# Patient Record
(demographics unavailable — no encounter records)

---

## 2025-01-13 NOTE — COUNSELING
[Sleep ___ hours/day] : Sleep [unfilled] hours/day [Encouraged to increase physical activity] : Encouraged to increase physical activity [Target Wt Loss Goal ___] : Weight Loss Goals: Target weight loss goal [unfilled] lbs [____ min/wk Activity] : [unfilled] min/wk activity

## 2025-01-14 NOTE — INTERPRETER SERVICES
[Patient Declined  Services] : - None: Patient declined  services [FreeTextEntry3] :  Shared Language Monegasque with MD

## 2025-01-14 NOTE — PHYSICAL EXAM
Returned callers call and she states she wants to cancel procedure a COS[facility]    [No Acute Distress] : no acute distress [Well Nourished] : well nourished [Well Developed] : well developed [Well-Appearing] : well-appearing [Normal] : no acute distress, well nourished, well developed and well-appearing [Normal Sclera/Conjunctiva] : normal sclera/conjunctiva [PERRL] : pupils equal round and reactive to light [EOMI] : extraocular movements intact [Normal Outer Ear/Nose] : the outer ears and nose were normal in appearance [Normal Oropharynx] : the oropharynx was normal [Normal TMs] : both tympanic membranes were normal [No JVD] : no jugular venous distention [No Lymphadenopathy] : no lymphadenopathy [Supple] : supple [Thyroid Normal, No Nodules] : the thyroid was normal and there were no nodules present [No Respiratory Distress] : no respiratory distress  [No Accessory Muscle Use] : no accessory muscle use [Clear to Auscultation] : lungs were clear to auscultation bilaterally [Normal Rate] : normal rate  [Regular Rhythm] : with a regular rhythm [Normal S1, S2] : normal S1 and S2 [No Murmur] : no murmur heard [No Carotid Bruits] : no carotid bruits [No Abdominal Bruit] : a ~M bruit was not heard ~T in the abdomen [No Varicosities] : no varicosities [Pedal Pulses Present] : the pedal pulses are present [No Edema] : there was no peripheral edema [No Palpable Aorta] : no palpable aorta [No Extremity Clubbing/Cyanosis] : no extremity clubbing/cyanosis [Soft] : abdomen soft [Non Tender] : non-tender [Non-distended] : non-distended [No Masses] : no abdominal mass palpated [No HSM] : no HSM [Normal Bowel Sounds] : normal bowel sounds [Normal Anterior Cervical Nodes] : no anterior cervical lymphadenopathy [Normal Posterior Cervical Nodes] : no posterior cervical lymphadenopathy [No CVA Tenderness] : no CVA  tenderness [No Spinal Tenderness] : no spinal tenderness [No Joint Swelling] : no joint swelling [Grossly Normal Strength/Tone] : grossly normal strength/tone [No Rash] : no rash [Coordination Grossly Intact] : coordination grossly intact [No Focal Deficits] : no focal deficits [Normal Gait] : normal gait [Normal Affect] : the affect was normal [Normal Insight/Judgement] : insight and judgment were intact [de-identified] : +bilateral calcaneal squeeze test

## 2025-01-14 NOTE — HISTORY OF PRESENT ILLNESS
[de-identified] : Patient is a 53F with hx of prediabetes, obesity, prior dermoid cyst on US (resolved on subsequent) here for CPE -chest pain pressure left sided with SOB at rest. radiate to her back.  Thinks it might be anxiety.  -6 month of foot pain located in the heal. Not worse in morning or at night, just constant. worse with walking. always wears sneakers.  -also notes recently she has been feeling pelvic discomfort. thinks it might be anxiety about her surgery, which she states was traumatizing for her.

## 2025-01-14 NOTE — PHYSICAL EXAM
[No Acute Distress] : no acute distress [Well Nourished] : well nourished [Well Developed] : well developed [Well-Appearing] : well-appearing [Normal] : no acute distress, well nourished, well developed and well-appearing [Normal Sclera/Conjunctiva] : normal sclera/conjunctiva [PERRL] : pupils equal round and reactive to light [EOMI] : extraocular movements intact [Normal Outer Ear/Nose] : the outer ears and nose were normal in appearance [Normal Oropharynx] : the oropharynx was normal [Normal TMs] : both tympanic membranes were normal [No JVD] : no jugular venous distention [No Lymphadenopathy] : no lymphadenopathy [Supple] : supple [Thyroid Normal, No Nodules] : the thyroid was normal and there were no nodules present [No Respiratory Distress] : no respiratory distress  [No Accessory Muscle Use] : no accessory muscle use [Clear to Auscultation] : lungs were clear to auscultation bilaterally [Normal Rate] : normal rate  [Regular Rhythm] : with a regular rhythm [Normal S1, S2] : normal S1 and S2 [No Murmur] : no murmur heard [No Carotid Bruits] : no carotid bruits [No Abdominal Bruit] : a ~M bruit was not heard ~T in the abdomen [No Varicosities] : no varicosities [Pedal Pulses Present] : the pedal pulses are present [No Edema] : there was no peripheral edema [No Palpable Aorta] : no palpable aorta [No Extremity Clubbing/Cyanosis] : no extremity clubbing/cyanosis [Soft] : abdomen soft [Non Tender] : non-tender [Non-distended] : non-distended [No Masses] : no abdominal mass palpated [No HSM] : no HSM [Normal Bowel Sounds] : normal bowel sounds [Normal Posterior Cervical Nodes] : no posterior cervical lymphadenopathy [Normal Anterior Cervical Nodes] : no anterior cervical lymphadenopathy [No CVA Tenderness] : no CVA  tenderness [No Spinal Tenderness] : no spinal tenderness [No Joint Swelling] : no joint swelling [Grossly Normal Strength/Tone] : grossly normal strength/tone [No Rash] : no rash [Coordination Grossly Intact] : coordination grossly intact [No Focal Deficits] : no focal deficits [Normal Gait] : normal gait [Normal Affect] : the affect was normal [Normal Insight/Judgement] : insight and judgment were intact [de-identified] : +bilateral calcaneal squeeze test

## 2025-01-14 NOTE — HEALTH RISK ASSESSMENT
[de-identified] : Very little [de-identified] : tries to eat healthy but difficult during the holidays for her [MammogramDate] : 11/23 [MammogramComments] : BIRADS 2 [PapSmearComments] : total hysterectomy [ColonoscopyComments] : June 2024

## 2025-01-14 NOTE — HISTORY OF PRESENT ILLNESS
[de-identified] : Patient is a 53F with hx of prediabetes, obesity, prior dermoid cyst on US (resolved on subsequent) here for CPE -chest pain pressure left sided with SOB at rest. radiate to her back.  Thinks it might be anxiety.  -6 month of foot pain located in the heal. Not worse in morning or at night, just constant. worse with walking. always wears sneakers.  -also notes recently she has been feeling pelvic discomfort. thinks it might be anxiety about her surgery, which she states was traumatizing for her.

## 2025-01-14 NOTE — HEALTH RISK ASSESSMENT
[de-identified] : Very little [de-identified] : tries to eat healthy but difficult during the holidays for her [MammogramDate] : 11/23 [MammogramComments] : BIRADS 2 [PapSmearComments] : total hysterectomy [ColonoscopyComments] : June 2024

## 2025-01-14 NOTE — PHYSICAL EXAM
[No Acute Distress] : no acute distress [Well Nourished] : well nourished [Well Developed] : well developed [Well-Appearing] : well-appearing [Normal] : no acute distress, well nourished, well developed and well-appearing [Normal Sclera/Conjunctiva] : normal sclera/conjunctiva [PERRL] : pupils equal round and reactive to light [EOMI] : extraocular movements intact [Normal Outer Ear/Nose] : the outer ears and nose were normal in appearance [Normal Oropharynx] : the oropharynx was normal [Normal TMs] : both tympanic membranes were normal [No JVD] : no jugular venous distention [No Lymphadenopathy] : no lymphadenopathy [Supple] : supple [Thyroid Normal, No Nodules] : the thyroid was normal and there were no nodules present [No Respiratory Distress] : no respiratory distress  [No Accessory Muscle Use] : no accessory muscle use [Clear to Auscultation] : lungs were clear to auscultation bilaterally [Normal Rate] : normal rate  [Regular Rhythm] : with a regular rhythm [Normal S1, S2] : normal S1 and S2 [No Murmur] : no murmur heard [No Carotid Bruits] : no carotid bruits [No Abdominal Bruit] : a ~M bruit was not heard ~T in the abdomen [No Varicosities] : no varicosities [Pedal Pulses Present] : the pedal pulses are present [No Edema] : there was no peripheral edema [No Palpable Aorta] : no palpable aorta [No Extremity Clubbing/Cyanosis] : no extremity clubbing/cyanosis [Soft] : abdomen soft [Non Tender] : non-tender [Non-distended] : non-distended [No Masses] : no abdominal mass palpated [No HSM] : no HSM [Normal Bowel Sounds] : normal bowel sounds [Normal Anterior Cervical Nodes] : no anterior cervical lymphadenopathy [Normal Posterior Cervical Nodes] : no posterior cervical lymphadenopathy [No CVA Tenderness] : no CVA  tenderness [No Spinal Tenderness] : no spinal tenderness [No Joint Swelling] : no joint swelling [Grossly Normal Strength/Tone] : grossly normal strength/tone [No Rash] : no rash [Coordination Grossly Intact] : coordination grossly intact [No Focal Deficits] : no focal deficits [Normal Gait] : normal gait [Normal Affect] : the affect was normal [Normal Insight/Judgement] : insight and judgment were intact [de-identified] : +bilateral calcaneal squeeze test

## 2025-01-14 NOTE — HEALTH RISK ASSESSMENT
[de-identified] : Very little [de-identified] : tries to eat healthy but difficult during the holidays for her [MammogramDate] : 11/23 [MammogramComments] : BIRADS 2 [PapSmearComments] : total hysterectomy [ColonoscopyComments] : June 2024

## 2025-01-14 NOTE — HISTORY OF PRESENT ILLNESS
[de-identified] : Patient is a 53F with hx of prediabetes, obesity, prior dermoid cyst on US (resolved on subsequent) here for CPE -chest pain pressure left sided with SOB at rest. radiate to her back.  Thinks it might be anxiety.  -6 month of foot pain located in the heal. Not worse in morning or at night, just constant. worse with walking. always wears sneakers.  -also notes recently she has been feeling pelvic discomfort. thinks it might be anxiety about her surgery, which she states was traumatizing for her.

## 2025-01-14 NOTE — ASSESSMENT
[FreeTextEntry1] : Follow up in 3 months with me for pelvic discomfort  Cardiology clinic   Case discussed with Dr. Tellez

## 2025-01-14 NOTE — REVIEW OF SYSTEMS
[Chest Pain] : chest pain [Negative] : Genitourinary [Palpitations] : no palpitations [Leg Claudication] : no leg claudication [Lower Ext Edema] : no lower extremity edema [Orthopnea] : no orthopnea [Paroxysmal Nocturnal Dyspnea] : no paroxysmal nocturnal dyspnea [FreeTextEntry8] : +pelvic pain [FreeTextEntry9] : +heal pain

## 2025-01-16 NOTE — HISTORY OF PRESENT ILLNESS
[FreeTextEntry1] : Language: Nauruan   53F PMH Prediabetes, uterine fribroids s/p hysterectomy referred for evaluation of chest pain. She reports 2 weeks of pleuritic central chest pressure/pain radiating to her central back. Endorses exertional component, feels SOB with exertion/climbing stairs. Pain radiates to the back of the left shoulderblade.  Denies any recent infectious symptoms. Pain is worse with exertion but never fully resolves. She had similar symptoms 2 years ago when she had an upper respiratory infection, symptoms resolved eventually, she's not sure how long it took.  Also endorses 2 weeks orthopnea, but is able to sleep flat with no pillows. Also endorsing some swellign in her feet but not in her legs   PMH:  pre-DM PSH: Hysterectomy and urethral sling  FH: Mother with dilated CMP and arrhythmia following woodsmoke inhalation, Father w/ ?CAD  SH: denies Meds: Metformin 500mg PO BID  All: NKDA   Exercise Stress Test (2023): negative for ischemia, achieved 7 METS, average for age  #Chest Pain   Recent labs obtained by PCP: normal lipid panel, HbA1c 6%, TSH wnl. Exercise stress test in 2023 for presurgical testing negative for ischemia, achieved 7 METS, average for age.   -TTE  -cCTA vs Stress test

## 2025-02-01 NOTE — INTERPRETER SERVICES
[Patient Declined  Services] : - None: Patient declined  services [FreeTextEntry3] :  Shared Language Syrian with MD

## 2025-02-01 NOTE — HISTORY OF PRESENT ILLNESS
[FreeTextEntry8] : Patient is a 53F with hx of prediabetes, obesity, prior dermoid cyst on US (resolved on subsequent) here for ED follow up -chest pain pressure left sided with SOB at rest. radiate to her back. Thinks it might be anxiety. Seen in ED for SOB but workup was non-revealing. Patient has also seen cardiology and is scheduled for a CT cardiac.   From 1/18 ED visit MDM: "53-year-old female with a past medical history of prediabetes presenting with shortness of breath for the past 1 month. Describes onset of intermittent mild shortness of breath and nonradiating mild chest tightness, lasting few minutes in duration, last episode was this morning at 9 AM. Of note she is been seen by outpatient cardiology and had a recent echo (3 days ago) which showed no wall motion abnormalities. (reviewed by me in EMR). Otherwise she denies any current chest pain or shortness of breath."

## 2025-02-01 NOTE — PHYSICAL EXAM
[Normal] : normal rate, regular rhythm, normal S1 and S2 and no murmur heard [No Edema] : there was no peripheral edema [Soft] : abdomen soft [Non Tender] : non-tender [Non-distended] : non-distended [No Masses] : no abdominal mass palpated

## 2025-02-01 NOTE — PHYSICAL EXAM
[Normal Appearance] : normal in appearance [No Masses] : no palpable masses [No Nipple Discharge] : no nipple discharge [No Axillary Lymphadenopathy] : no axillary lymphadenopathy [de-identified] :  Introduced self to patient and explained role as resident. Explained need and purpose of breast exam and obtained patients consent.

## 2025-02-01 NOTE — INTERPRETER SERVICES
[Patient Declined  Services] : - None: Patient declined  services [FreeTextEntry3] :  Shared Language Canadian with MD

## 2025-02-01 NOTE — PHYSICAL EXAM
[Normal Appearance] : normal in appearance [No Masses] : no palpable masses [No Nipple Discharge] : no nipple discharge [No Axillary Lymphadenopathy] : no axillary lymphadenopathy [de-identified] :  Introduced self to patient and explained role as resident. Explained need and purpose of breast exam and obtained patients consent.

## 2025-02-01 NOTE — PHYSICAL EXAM
[Normal] : no respiratory distress, lungs were clear to auscultation bilaterally and no accessory muscle use [No Edema] : there was no peripheral edema [Soft] : abdomen soft [Non Tender] : non-tender [Non-distended] : non-distended [No Masses] : no abdominal mass palpated

## 2025-02-01 NOTE — INTERPRETER SERVICES
[Patient Declined  Services] : - None: Patient declined  services [FreeTextEntry3] :  Shared Language French with MD

## 2025-02-14 NOTE — HISTORY OF PRESENT ILLNESS
[de-identified] : Patient is a 53F with hx of prediabetes, obesity, prior dermoid cyst on US (resolved on subsequent) here for anxiety follow up.  Wed went to the ED.  From MDM:" 53-year-old female with history of prediabetes, TY/BSO, complaining of vomiting for the last 3 days, of 4-6 times a day, yellowish, nonbloody, not coffee-ground-like. Associated with diarrhea today watery nonbloody several episodes today. Has intermittent abdominal pains. No fever or chills. No chest pain or shortness of breath. No hematuria dysuria or urinary frequency. No sick contacts or travel.  Patient appears uncomfortable, nauseous retching. Vitals unremarkable. Abdomen soft and nontender. Most likely viral gastroenteritis. Partial DDx: Viral gastroenteritis, gastritis, colitis, food poisoning, bowel obstruction will check labs. Give IV fluids Zofran Pepcid Toradol. Consider CT abdomen pelvis for worsening or persisting symptoms. Update: Labs unremarkable. Patient tolerating p.o. fluids and crackers now. Feeling better. Still no abdominal tenderness. Zofran prescribed. Follow-up with PMD. Return for worsening." Today patient states she is feeling somewhat better but still having a lot of acid reflux. Tolerating Pedialyte. No vomiting or diarrhea since Wednesday. Only has mild abdominal pain above her belly button.

## 2025-02-14 NOTE — REVIEW OF SYSTEMS
[Abdominal Pain] : abdominal pain [Nausea] : nausea [Constipation] : no constipation [Diarrhea] : diarrhea [Vomiting] : no vomiting [Heartburn] : heartburn [Melena] : no melena [Skin Rash] : no skin rash [Negative] : Genitourinary

## 2025-02-14 NOTE — INTERPRETER SERVICES
[Patient Declined  Services] : - None: Patient declined  services [FreeTextEntry3] :  Shared Language Faroese with MD

## 2025-02-14 NOTE — PHYSICAL EXAM
[Normal] : normal rate, regular rhythm, normal S1 and S2 and no murmur heard [No Edema] : there was no peripheral edema [Soft] : abdomen soft [Non-distended] : non-distended [No Masses] : no abdominal mass palpated [de-identified] : +epigastric TTP

## 2025-02-14 NOTE — HISTORY OF PRESENT ILLNESS
[de-identified] : Patient is a 53F with hx of prediabetes, obesity, prior dermoid cyst on US (resolved on subsequent) here for anxiety follow up.  Wed went to the ED.  From MDM:" 53-year-old female with history of prediabetes, TY/BSO, complaining of vomiting for the last 3 days, of 4-6 times a day, yellowish, nonbloody, not coffee-ground-like. Associated with diarrhea today watery nonbloody several episodes today. Has intermittent abdominal pains. No fever or chills. No chest pain or shortness of breath. No hematuria dysuria or urinary frequency. No sick contacts or travel.  Patient appears uncomfortable, nauseous retching. Vitals unremarkable. Abdomen soft and nontender. Most likely viral gastroenteritis. Partial DDx: Viral gastroenteritis, gastritis, colitis, food poisoning, bowel obstruction will check labs. Give IV fluids Zofran Pepcid Toradol. Consider CT abdomen pelvis for worsening or persisting symptoms. Update: Labs unremarkable. Patient tolerating p.o. fluids and crackers now. Feeling better. Still no abdominal tenderness. Zofran prescribed. Follow-up with PMD. Return for worsening." Today patient states she is feeling somewhat better but still having a lot of acid reflux. Tolerating Pedialyte. No vomiting or diarrhea since Wednesday. Only has mild abdominal pain above her belly button.

## 2025-02-14 NOTE — PHYSICAL EXAM
[Normal] : normal rate, regular rhythm, normal S1 and S2 and no murmur heard [No Edema] : there was no peripheral edema [Soft] : abdomen soft [Non-distended] : non-distended [No Masses] : no abdominal mass palpated [de-identified] : +epigastric TTP

## 2025-02-27 NOTE — ASSESSMENT
[FreeTextEntry1] : #Chest Pain  normal lipid panel, HbA1c 6%, TSH, CRP, hsTrop, proBNP, D-dimer all wnl. Exercise stress test in 2023 for presurgical testing wnl, Coronary CTA with mild luminal stenosis, calcium score 0. TTE wnl. Pain now resolved.   #Palpitations - Zio patch for 14d monitor   #Non-Obstructive CAD - Start atorvastatin 40mg PO QHS  - yearly lipid panel

## 2025-02-27 NOTE — HISTORY OF PRESENT ILLNESS
[FreeTextEntry1] : Language: Yi   53F PMH Prediabetes, uterine fribroids s/p hysterectomy referred for evaluation of chest pain. At initial visit reported 2 weeks of pleuritic, exertional central chest pressure/pain radiating to her central back/left shoulder associated with TUBBS and orthopnea, although able to sleep lying flat. Pain was worse with exertion but never fully resolves. Similar symptoms 2 years prior following URI.   On this visit reports chest pain has resolved, reports occasional palpitations at work when she is stressed 2-3x/week, denies any dizziness/lightheadedness with these episodes. Reports occasional SOB that improves with rest, but is able to climb flights of stairs and walk several blocks without difficulty.   PMH: pre-DM  PSH: Hysterectomy and urethral sling  FH: Mother with dilated CMP and arrhythmia following woodsmoke inhalation, Father w/ ?CAD  SH: denies  Meds: Metformin 500mg PO BID  All: NKDA   Exercise Stress Test (2023): negative for ischemia, achieved 7 METS, average for age  Coronary CTA 1/2025: Calcium score 0, minimal luminal stenosis  TTE 1/2025: nl BiV function, no valvular disease  Lipid Panel 1/2025: LDL 95, HDL 61, TGs 96, Chol 173

## 2025-03-05 NOTE — REVIEW OF SYSTEMS
[Dysuria] : no dysuria [Incontinence] : no incontinence [Pelvic Pain] : pelvic pain [Frequency] : no frequency [Urgency] : no urgency [Urethral Discharge] : no abnormal urethral discharge [Nl] : Integumentary

## 2025-03-05 NOTE — HISTORY OF PRESENT ILLNESS
[Lower-Rt-Q] : lower right quadrant [Lower-Lt-Q] : left lower quadrant [Continuous] : no continuous [Pelvic Pressure] : pelvic pressure [Sexually Active] : is sexually active [Monogamous] : is monogamous

## 2025-03-05 NOTE — PHYSICAL EXAM
[Chaperone Declined] : Patient declined chaperone [Awake] : awake [Alert] : alert [Acute Distress] : no acute distress [Mass] : no breast mass [Nipple Discharge] : no nipple discharge [Axillary LAD] : no axillary lymphadenopathy [Soft] : soft [Tender] : non tender [Distended] : not distended [H/Smegaly] : no hepatosplenomegaly [Oriented x3] : oriented to person, place, and time [Depressed Mood] : not depressed [Flat Affect] : affect not flat [Vulvar Atrophy] : vulvar atrophy [Normal] : cervix [Labia Majora] : labia major [Labia Minora] : labia minora [Atrophy] : atrophy [Dry Mucosa] : dry mucosa [No Bleeding] : there was no active vaginal bleeding [Pap Obtained] : a Pap smear was performed [Absent] : absent [Uterine Adnexae] : were not tender and not enlarged [FreeTextEntry4] : on exam, it appears that anterior wall of vagina is somewhat lower than expected with some excessive tissue evident in this area on exam.  pt is s/p anterior repair in 2023. [FreeTextEntry6] : + tenderness on exam, especially to anterior wall, no masses on exam, unable to reciprocate pelvic pain with biamanual exam

## 2025-03-05 NOTE — REVIEW OF SYSTEMS
Mark Castaneda(Attending) [Dysuria] : no dysuria [Incontinence] : no incontinence [Pelvic Pain] : pelvic pain [Frequency] : no frequency [Urgency] : no urgency [Urethral Discharge] : no abnormal urethral discharge [Nl] : Integumentary

## 2025-03-05 NOTE — BEGINNING OF VISIT
Pt states hx of dislocating left shoulder. Pt states dislocated shoulder today and was put back into place, but experiencing a lot of pain.      Alexis Gage  04/18/23 [id number] [Patient] : patient [] :  [Language Line ] : provided by Language Line   [Interpreters_IDNumber] : [id number] [TWNoteComboBox1] : Sri Lankan

## 2025-03-05 NOTE — BEGINNING OF VISIT
[Patient] : patient [] :  [Language Line ] : provided by Language Line   [Interpreters_IDNumber] : [id number] [TWNoteComboBox1] : Bermudian

## 2025-03-28 NOTE — HISTORY OF PRESENT ILLNESS
[FreeTextEntry1] : 54yo presenting with vaginal bulge for the past 3 months. Saw her GYN who states she has some redundant vaginal tissue.   PMH: HLD, pre-DM PSH: SCHBSO with suburethral sling and anterior repair on 5/8/23 due to fibroid uterus; UAE, CS   Day time voids: 10 Nighttime voids: 2-3 ELAN: no UUI: no Urinary urgency: yes Bladder irritants: coffee, tea, seltzer, tomatoes, chocolate, citrus, spicy food  BM: constipation - metamucil  Fecal Incontinence: no Vaginal bulge: yes, yes splinting for comfort  Prior treatment: suburethral sling and anterior repair  Voiding dysfunction: no incomplete bladder emptying Lower urinary tract/vaginal symptoms: no UTIs in past year, no hematuria, no bladder pain Sexually active: yes, dyspareunia and dryness  Pelvic pain: pain where the ovaries were LMP: 2 yrs ago, no VB Pap smears: 2025, normal  Labs and chart reviewed: SCHBSO with suburethral sling and anterior repair on 5/8/23 by Dr. Poe and Dr. Garcia; TVUS normal 1/2025

## 2025-03-28 NOTE — PROCEDURE
[Straight Catheterization] : insertion of a straight catheter [Urinary Frequency] : urinary frequency [Clear] : clear [Culture] : culture [Urinalysis] : urinalysis [Tolerated Well] : the patient tolerated the procedure well

## 2025-03-28 NOTE — REASON FOR VISIT
[Initial Visit ___] : an initial visit for [unfilled] [Language Line ] : provided by Language Line   [Pelvic Organ Prolapse] : pelvic organ prolapse [Urinary Urgency] : urinary urgency [Nocturia] : nocturia [Interpreters_IDNumber] : [id number] [Questionnaire Received] : Patient questionnaire received [Intake Form Reviewed] : Patient intake form with past medical history, surgical history, family history and social history reviewed today

## 2025-03-28 NOTE — DISCUSSION/SUMMARY
[FreeTextEntry1] : 54yo with anterior vaginal wall prolapse, vaginal atrophy and urinary urgency. Negative CST. PVR 40cc.  1. Anterior vaginal wall prolapse -We discussed that she has anterior vaginal wall prolapse but it is not severe or out past the vaginal opening. She will start vaginal estrogen cream and f/up in 2-3 months to reassess symptoms  2. Vaginal atrophy -We discussed that vaginal estrogen cream is not hormone replacement therapy and is very minimally systemically absorbed. We discussed its benefits in terms of improving vaginal tissue quality, intercourse, urinary symptoms and decreased UTIs. Patient will begin to use 3x/week.  3. Urinary urgency -Will start avoiding bladder irritants and f/up in 2-3 months

## 2025-03-28 NOTE — PHYSICAL EXAM
[Chaperone Present] : A chaperone was present in the examining room during all aspects of the physical examination [No Acute Distress] : in no acute distress [Well developed] : well developed [Oriented x3] : oriented to person, place, and time [Normal Memory] : ~T memory was ~L unimpaired [Warm and Dry] : was warm and dry to touch [Normal Gait] : gait was normal [Labia Majora] : were normal [Labia Minora] : were normal [Atrophy] : atrophy [2] : 2 [Aa ____] : Aa [unfilled] [Ba ____] : Ba [unfilled] [C ____] : C [unfilled] [GH ____] : GH [unfilled] [PB ____] : PB [unfilled] [TVL ____] : TVL  [unfilled] [Ap ____] : Ap [unfilled] [Bp ____] : Bp [unfilled] [D ____] : D [unfilled] [Absent] : absent [Normal] : no abnormalities [Post Void Residual ____ml] : post void residual was [unfilled] ml [Exam Deferred] : was deferred [FreeTextEntry2] : Geni [Cough] : no cough [FreeTextEntry3] : negative CST

## 2025-04-09 NOTE — PHYSICAL EXAM
[de-identified] : +bilateral heal TTP, ngavtive heel squeeze test. sensation inatct. pulses intact. no skin changes

## 2025-04-09 NOTE — PHYSICAL EXAM
[de-identified] : +bilateral heal TTP, ngavtive heel squeeze test. sensation inatct. pulses intact. no skin changes

## 2025-04-09 NOTE — ASSESSMENT
[FreeTextEntry1] :   Case discussed with Dr. Mayo [Never] : Never [No] : In the past 12 months have you used drugs other than those required for medical reasons? No [No falls in past year] : Patient reported no falls in the past year [0] : 2) Feeling down, depressed, or hopeless: Not at all (0) [de-identified] : none [de-identified] : regular diet

## 2025-04-09 NOTE — HISTORY OF PRESENT ILLNESS
[FreeTextEntry1] : Heel pain [de-identified] : Patient is a 53F with hx of prediabetes, obesity, prior dermoid cyst on US (resolved on subsequent) here for gastritis follow up.  states gastritis and reflux is resolved -now c/o heel pain which has been ongoing for a few months but has worsened recently. bilateral and symmetrical. worse in the mornings and when lying down. Does not wear supportive sneakers often -also c/o 1 month of neuropathy in her feet. With numbness and tingling and burning bilaterally

## 2025-04-09 NOTE — HISTORY OF PRESENT ILLNESS
[FreeTextEntry1] : Heel pain [de-identified] : Patient is a 53F with hx of prediabetes, obesity, prior dermoid cyst on US (resolved on subsequent) here for gastritis follow up.  states gastritis and reflux is resolved -now c/o heel pain which has been ongoing for a few months but has worsened recently. bilateral and symmetrical. worse in the mornings and when lying down. Does not wear supportive sneakers often -also c/o 1 month of neuropathy in her feet. With numbness and tingling and burning bilaterally

## 2025-04-17 NOTE — HISTORY OF PRESENT ILLNESS
[FreeTextEntry1] : Language: Belgian   53F PMH Prediabetes, uterine fribroids s/p hysterectomy referred for evaluation of chest pain. At initial visit reported 2 weeks of pleuritic, exertional central chest pressure/pain radiating to her central back/left shoulder associated with TUBBS and orthopnea, although able to sleep lying flat. Pain was worse with exertion but never fully resolves. Similar symptoms 2 years prior following URI.   On last visit chest pain had resolved, had occasional palpitations at work when she is stressed 2-3x/week, denies any dizziness/lightheadedness with these episodes. Reports occasional SOB that improves with rest, but was able to climb flights of stairs and walk several blocks without difficulty.   PMH: pre-DM  PSH: Hysterectomy and urethral sling  FH: Mother with dilated CMP and arrhythmia following woodsmoke inhalation, Father w/ ?CAD  SH: denies  Meds:   Metformin 500mg PO BID  Atorvastatin 40mg PO QHS  All: NKDA   Exercise Stress Test (2023): negative for ischemia, achieved 7 METS, average for age  Coronary CTA 1/2025: Calcium score 0, minimal luminal stenosis  TTE 1/2025: nl BiV function, no valvular disease  Zio Patch 14d 2/2025: Isolated APCs (98), Occasional PVCs (2020)  Lipid Panel 1/2025: LDL 95, HDL 61, TGs 96, Chol 173   #Chest Pain  normal lipid panel, HbA1c 6%, TSH, CRP, hsTrop, proBNP, D-dimer all wnl. Exercise stress test in 2023 for presurgical testing wnl, Coronary CTA with mild luminal stenosis, calcium score 0. TTE wnl. Pain now resolved.   #Palpitations  - 14d monitor showed occasional APCs and PVCs   #Non-Obstructive CAD  - c/w atorvastatin 40mg PO QHS  - yearly lipid panel.

## 2025-05-23 NOTE — HISTORY OF PRESENT ILLNESS
[FreeTextEntry1] : Neuropathy follow up.  [de-identified] : Patient is a 53F with hx of prediabetes, obesity, prior dermoid cyst on US (resolved on subsequent) here for paresthesia follow up.  Seen by podiatry on 5/1 for foot neuropathy, suspect to be plantar fasciitis. Started on meloxicam. Due for follow up in beginning of June.  Patient notes that neuropathy is persistent, though not severe, and had some thigh aches lately.

## 2025-05-23 NOTE — INTERPRETER SERVICES
[Patient Declined  Services] : - None: Patient declined  services [FreeTextEntry3] :  Shared Language Namibian with MD

## 2025-05-23 NOTE — PHYSICAL EXAM
[Normal] : normal rate, regular rhythm, normal S1 and S2 and no murmur heard [No Edema] : there was no peripheral edema [Soft] : abdomen soft [Non Tender] : non-tender [Non-distended] : non-distended [No Masses] : no abdominal mass palpated [de-identified] : +bilateral heal TTP, ngavtive heel squeeze test. sensation inatct. pulses intact. no skin changes

## 2025-05-27 NOTE — HISTORY OF PRESENT ILLNESS
[de-identified] : 53 year old female presents for vaginal prolapse. Patient was seen by urogyn in March and was prescribed vaginal estrogen cream. Patient states she stopped using it because she developed a vaginal infection and had to self treat with OTC miconazole. Patient states she still feels a bulge in her vagina and also reports she has urge incontinence.

## 2025-05-27 NOTE — HISTORY OF PRESENT ILLNESS
[de-identified] : 53 year old female presents for vaginal prolapse. Patient was seen by urogyn in March and was prescribed vaginal estrogen cream. Patient states she stopped using it because she developed a vaginal infection and had to self treat with OTC miconazole. Patient states she still feels a bulge in her vagina and also reports she has urge incontinence.

## 2025-05-27 NOTE — INTERPRETER SERVICES
[Language Line ] : provided by Language Line   [Interpreters_IDNumber] : [id number] [TWNoteComboBox1] : Indian

## 2025-05-27 NOTE — PHYSICAL EXAM
[Normal] : no acute distress, well nourished, well developed and well-appearing [Soft] : abdomen soft [Non Tender] : non-tender [Non-distended] : non-distended [External Female Genitalia] : normal external genitalia [Cervix] : normal cervix [Uterus] : uterus was normal size, without masses or tenderness [FreeTextEntry1] : - minimal prolapse appreciated

## 2025-05-27 NOTE — INTERPRETER SERVICES
[Language Line ] : provided by Language Line   [Interpreters_IDNumber] : [id number] [TWNoteComboBox1] : Cook Islander

## 2025-06-20 NOTE — HISTORY OF PRESENT ILLNESS
[FreeTextEntry8] : 54yo F here for CSP FIT [de-identified] : Patient is a 53F with hx of prediabetes, obesity, prior dermoid cyst on US (resolved on subsequent) here for elevated CK follow up.  Patient denies new muscle aches or dark urine. States she feels ok. States that foot pain is also improving. Is drinking ALOT of water.

## 2025-06-20 NOTE — HISTORY OF PRESENT ILLNESS
[FreeTextEntry8] : 52yo F here for CSP FIT [de-identified] : Patient is a 53F with hx of prediabetes, obesity, prior dermoid cyst on US (resolved on subsequent) here for elevated CK follow up.  Patient denies new muscle aches or dark urine. States she feels ok. States that foot pain is also improving. Is drinking ALOT of water.

## 2025-06-20 NOTE — INTERPRETER SERVICES
[Patient Declined  Services] : - None: Patient declined  services [FreeTextEntry3] :  Shared Language Eritrean with MD

## 2025-06-20 NOTE — HISTORY OF PRESENT ILLNESS
[FreeTextEntry8] : 52yo F here for CSP FIT [de-identified] : Patient is a 53F with hx of prediabetes, obesity, prior dermoid cyst on US (resolved on subsequent) here for elevated CK follow up.  Patient denies new muscle aches or dark urine. States she feels ok. States that foot pain is also improving. Is drinking ALOT of water.

## 2025-06-20 NOTE — INTERPRETER SERVICES
[Patient Declined  Services] : - None: Patient declined  services [FreeTextEntry3] :  Shared Language Brazilian with MD

## 2025-06-20 NOTE — INTERPRETER SERVICES
[Patient Declined  Services] : - None: Patient declined  services [FreeTextEntry3] :  Shared Language Azerbaijani with MD

## 2025-06-20 NOTE — HISTORY OF PRESENT ILLNESS
[FreeTextEntry8] : 52yo F here for CSP FIT [de-identified] : Patient is a 53F with hx of prediabetes, obesity, prior dermoid cyst on US (resolved on subsequent) here for elevated CK follow up.  Patient denies new muscle aches or dark urine. States she feels ok. States that foot pain is also improving. Is drinking ALOT of water.

## 2025-06-20 NOTE — INTERPRETER SERVICES
[Patient Declined  Services] : - None: Patient declined  services [FreeTextEntry3] :  Shared Language Lao with MD

## 2025-06-20 NOTE — ASSESSMENT
[FreeTextEntry1] : Patient will bring in records from her colonoscopy, which she states she got at 50 years old   Case discussed with Dr. Gross

## 2025-07-23 NOTE — INTERPRETER SERVICES
[Patient Declined  Services] : - None: Patient declined  services [FreeTextEntry3] :  Shared Language Puerto Rican with MD

## 2025-07-23 NOTE — INTERPRETER SERVICES
[Patient Declined  Services] : - None: Patient declined  services [FreeTextEntry3] :  Shared Language Slovak with MD

## 2025-07-23 NOTE — HISTORY OF PRESENT ILLNESS
[FreeTextEntry1] : CK follow up [de-identified] : Patient is a 53F with hx of prediabetes, obesity, prior dermoid cyst on US (resolved on subsequent) here for elevated CK follow up.  Patient denies new muscle aches or dark urine. States she feels ok. States that foot pain is also improving. Is drinking ALOT of water.  No acute complaints

## 2025-07-23 NOTE — INTERPRETER SERVICES
[Patient Declined  Services] : - None: Patient declined  services [FreeTextEntry3] :  Shared Language Libyan with MD

## 2025-07-23 NOTE — HISTORY OF PRESENT ILLNESS
[FreeTextEntry1] : CK follow up [de-identified] : Patient is a 53F with hx of prediabetes, obesity, prior dermoid cyst on US (resolved on subsequent) here for elevated CK follow up.  Patient denies new muscle aches or dark urine. States she feels ok. States that foot pain is also improving. Is drinking ALOT of water.  No acute complaints

## 2025-07-23 NOTE — HISTORY OF PRESENT ILLNESS
[FreeTextEntry1] : CK follow up [de-identified] : Patient is a 53F with hx of prediabetes, obesity, prior dermoid cyst on US (resolved on subsequent) here for elevated CK follow up.  Patient denies new muscle aches or dark urine. States she feels ok. States that foot pain is also improving. Is drinking ALOT of water.  No acute complaints